# Patient Record
Sex: FEMALE | Race: ASIAN
[De-identification: names, ages, dates, MRNs, and addresses within clinical notes are randomized per-mention and may not be internally consistent; named-entity substitution may affect disease eponyms.]

---

## 2020-04-09 ENCOUNTER — HOSPITAL ENCOUNTER (EMERGENCY)
Dept: HOSPITAL 26 - MED | Age: 50
Discharge: HOME | End: 2020-04-09
Payer: COMMERCIAL

## 2020-04-09 VITALS — WEIGHT: 163 LBS | BODY MASS INDEX: 27.16 KG/M2 | HEIGHT: 65 IN

## 2020-04-09 VITALS — DIASTOLIC BLOOD PRESSURE: 94 MMHG | SYSTOLIC BLOOD PRESSURE: 131 MMHG

## 2020-04-09 VITALS — DIASTOLIC BLOOD PRESSURE: 95 MMHG | SYSTOLIC BLOOD PRESSURE: 139 MMHG

## 2020-04-09 DIAGNOSIS — E07.9: ICD-10-CM

## 2020-04-09 DIAGNOSIS — J45.909: ICD-10-CM

## 2020-04-09 DIAGNOSIS — R03.0: ICD-10-CM

## 2020-04-09 DIAGNOSIS — R42: ICD-10-CM

## 2020-04-09 DIAGNOSIS — M79.10: Primary | ICD-10-CM

## 2020-04-09 LAB
ALBUMIN FLD-MCNC: 4.3 G/DL (ref 3.4–5)
ANION GAP SERPL CALCULATED.3IONS-SCNC: 13.8 MMOL/L (ref 8–16)
APPEARANCE UR: CLEAR
AST SERPL-CCNC: 23 U/L (ref 15–37)
BASOPHILS # BLD AUTO: 0 K/UL (ref 0–0.22)
BASOPHILS NFR BLD AUTO: 0.7 % (ref 0–2)
BILIRUB SERPL-MCNC: 0.4 MG/DL (ref 0–1)
BILIRUB UR QL STRIP: NEGATIVE
BUN SERPL-MCNC: 8 MG/DL (ref 7–18)
CHLORIDE SERPL-SCNC: 102 MMOL/L (ref 98–107)
CO2 SERPL-SCNC: 28.6 MMOL/L (ref 21–32)
COLOR UR: YELLOW
CREAT SERPL-MCNC: 0.7 MG/DL (ref 0.6–1.3)
EOSINOPHIL # BLD AUTO: 0.1 K/UL (ref 0–0.4)
EOSINOPHIL NFR BLD AUTO: 1.1 % (ref 0–4)
ERYTHROCYTE [DISTWIDTH] IN BLOOD BY AUTOMATED COUNT: 12.3 % (ref 11.6–13.7)
GFR SERPL CREATININE-BSD FRML MDRD: 115 ML/MIN (ref 90–?)
GLUCOSE SERPL-MCNC: 126 MG/DL (ref 74–106)
GLUCOSE UR STRIP-MCNC: NEGATIVE MG/DL
HCT VFR BLD AUTO: 41.9 % (ref 36–48)
HGB BLD-MCNC: 13.9 G/DL (ref 12–16)
HGB UR QL STRIP: NEGATIVE
LEUKOCYTE ESTERASE UR QL STRIP: NEGATIVE
LIPASE SERPL-CCNC: 124 U/L (ref 73–393)
LYMPHOCYTES # BLD AUTO: 1.6 K/UL (ref 2.5–16.5)
LYMPHOCYTES NFR BLD AUTO: 23.5 % (ref 20.5–51.1)
MCH RBC QN AUTO: 30 PG (ref 27–31)
MCHC RBC AUTO-ENTMCNC: 33 G/DL (ref 33–37)
MCV RBC AUTO: 89.4 FL (ref 80–94)
MONOCYTES # BLD AUTO: 0.3 K/UL (ref 0.8–1)
MONOCYTES NFR BLD AUTO: 5 % (ref 1.7–9.3)
NEUTROPHILS # BLD AUTO: 4.6 K/UL (ref 1.8–7.7)
NEUTROPHILS NFR BLD AUTO: 69.7 % (ref 42.2–75.2)
NITRITE UR QL STRIP: NEGATIVE
PH UR STRIP: 6 [PH] (ref 5–9)
PLATELET # BLD AUTO: 432 K/UL (ref 140–450)
POTASSIUM SERPL-SCNC: 3.4 MMOL/L (ref 3.5–5.1)
RBC # BLD AUTO: 4.68 MIL/UL (ref 4.2–5.4)
SODIUM SERPL-SCNC: 141 MMOL/L (ref 136–145)
WBC # BLD AUTO: 6.7 K/UL (ref 4.8–10.8)

## 2020-04-09 PROCEDURE — 85025 COMPLETE CBC W/AUTO DIFF WBC: CPT

## 2020-04-09 PROCEDURE — 96374 THER/PROPH/DIAG INJ IV PUSH: CPT

## 2020-04-09 PROCEDURE — 93005 ELECTROCARDIOGRAM TRACING: CPT

## 2020-04-09 PROCEDURE — 80053 COMPREHEN METABOLIC PANEL: CPT

## 2020-04-09 PROCEDURE — 83690 ASSAY OF LIPASE: CPT

## 2020-04-09 PROCEDURE — 99285 EMERGENCY DEPT VISIT HI MDM: CPT

## 2020-04-09 PROCEDURE — 36415 COLL VENOUS BLD VENIPUNCTURE: CPT

## 2020-04-09 PROCEDURE — 87804 INFLUENZA ASSAY W/OPTIC: CPT

## 2020-04-09 PROCEDURE — 81025 URINE PREGNANCY TEST: CPT

## 2020-04-09 PROCEDURE — 81003 URINALYSIS AUTO W/O SCOPE: CPT

## 2020-04-09 PROCEDURE — 84484 ASSAY OF TROPONIN QUANT: CPT

## 2020-04-09 PROCEDURE — 71045 X-RAY EXAM CHEST 1 VIEW: CPT

## 2020-04-09 PROCEDURE — 96361 HYDRATE IV INFUSION ADD-ON: CPT

## 2020-04-17 ENCOUNTER — OFFICE (OUTPATIENT)
Dept: URBAN - METROPOLITAN AREA CLINIC 73 | Facility: CLINIC | Age: 50
End: 2020-04-17

## 2020-04-17 VITALS
WEIGHT: 161 LBS | DIASTOLIC BLOOD PRESSURE: 88 MMHG | SYSTOLIC BLOOD PRESSURE: 125 MMHG | TEMPERATURE: 98.2 F | HEART RATE: 86 BPM | HEIGHT: 65 IN

## 2020-04-17 DIAGNOSIS — R10.13 EPIGASTRIC PAIN: ICD-10-CM

## 2020-04-17 PROCEDURE — 99204 OFFICE O/P NEW MOD 45 MIN: CPT | Performed by: INTERNAL MEDICINE

## 2020-04-17 RX ORDER — SUCRALFATE 1 G/1
4 TABLET ORAL
Qty: 120 | Status: ACTIVE
Start: 2020-04-17

## 2020-04-17 RX ORDER — SUCRALFATE 1 G/1
4 TABLET ORAL
Qty: 120 | Status: COMPLETED
Start: 2020-04-17 | End: 2020-06-10

## 2020-04-17 RX ORDER — PANTOPRAZOLE SODIUM 40 MG/1
TABLET, DELAYED RELEASE ORAL
Qty: 90 | Status: COMPLETED
Start: 2020-04-17 | End: 2020-06-10

## 2020-04-17 NOTE — SERVICEHPINOTES
This is a 49-year-old female with below history who presents today for evaluation of chronic intermittent epigastric discomfort.  The symptoms have been especially pronounced over the last 2 to 3 months.  She complains of epigastric pain, lasting several hours, usually triggered by eating cold foods and hard foods.  No weight loss, night sweats, overt GI bleeding, dysphasia, recurrent nausea and vomiting.  She has never had an upper endoscopy in the past.  No family history of gastric cancer.Due to the symptoms, she went to hospital in [place] emergency room where she underwent routine laboratory testing showing normal LFTs, CBC and had a noncontrast CT of the abdomen and pelvis which was unremarkable.  She was even tested for COVID-19 that was negative as well.BR

## 2020-04-22 LAB — HELICOBACTER PYLORI, UREA BREATH TEST: DETECTED

## 2020-05-26 ENCOUNTER — OFFICE (OUTPATIENT)
Dept: URBAN - METROPOLITAN AREA CLINIC 73 | Facility: CLINIC | Age: 50
End: 2020-05-26

## 2020-05-26 VITALS — HEIGHT: 65 IN

## 2020-05-26 DIAGNOSIS — B96.81 HELICOBACTER PYLORI-ASSOCIATED GASTRITIS: ICD-10-CM

## 2020-05-26 DIAGNOSIS — R10.13 EPIGASTRIC PAIN: ICD-10-CM

## 2020-05-26 PROCEDURE — G0406 INPT/TELE FOLLOW UP 15: HCPCS | Performed by: INTERNAL MEDICINE

## 2020-05-26 PROCEDURE — 99213 OFFICE O/P EST LOW 20 MIN: CPT | Performed by: INTERNAL MEDICINE

## 2020-05-26 NOTE — SERVICEHPINOTES
This is a 49-year-old female with below history who presents today for evaluation of chronic intermittent epigastric discomfort. Due to the symptoms, she went to hospital in [place] emergency room where she underwent routine laboratory testing showing normal LFTs, CBC and had a noncontrast CT of the abdomen and pelvis which was unremarkable. She was even tested for COVID-19 that was negative as well.    She was sent for H. pylori testing and tested positive.  Subsequently, she was treated with triple therapy.  She is being followed via telehealth today and is feeling well.  She reports above 90% reduction in her symptoms.  She is continuing to take Protonix.  She denies any overt GI bleeding, weight loss, nausea, vomiting, dysphasia.BR

## 2020-05-26 NOTE — SERVICENOTES
this visit was made via telephone call as patient had great difficulty accessing and activating [employer].

## 2020-06-10 ENCOUNTER — AMBULATORY SURGICAL CENTER (OUTPATIENT)
Dept: URBAN - METROPOLITAN AREA SURGERY 4 | Facility: SURGERY | Age: 50
End: 2020-06-10

## 2020-06-10 VITALS — HEIGHT: 65 IN

## 2020-06-10 DIAGNOSIS — R10.13 EPIGASTRIC PAIN: ICD-10-CM

## 2020-06-10 DIAGNOSIS — K31.89 OTHER DISEASES OF STOMACH AND DUODENUM: ICD-10-CM

## 2020-06-10 DIAGNOSIS — B96.81 HELICOBACTER PYLORI AS THE CAUSE OF DISEASES CLASSD ELSWHR: ICD-10-CM

## 2020-06-10 PROCEDURE — 43239 EGD BIOPSY SINGLE/MULTIPLE: CPT | Performed by: INTERNAL MEDICINE

## 2020-06-10 NOTE — SERVICEHPINOTES
This is a 49-year-old female with below history who presents today for evaluation of chronic intermittent epigastric discomfort. Due to the symptoms, she went to hospital in [place] emergency room where she underwent routine laboratory testing showing normal LFTs, CBC and had a noncontrast CT of the abdomen and pelvis which was unremarkable. She was even tested for COVID-19 that was negative as well. She was sent for H. pylori testing and tested positive. Subsequently, she was treated with triple therapy. She is being followed via telehealth today and is feeling well. She reports above 90% reduction in her symptoms. She is continuing to take Protonix. She denies any overt GI bleeding, weight loss, nausea, vomiting, dysphasia.

## 2020-06-11 LAB — SURGICAL: PDF REPORT: (no result)

## 2020-06-24 ENCOUNTER — Encounter (OUTPATIENT)
Dept: URBAN - METROPOLITAN AREA CLINIC 74 | Facility: CLINIC | Age: 50
End: 2020-06-24

## 2020-06-24 VITALS — HEIGHT: 65 IN

## 2020-06-24 DIAGNOSIS — R10.13 EPIGASTRIC PAIN: ICD-10-CM

## 2020-06-24 DIAGNOSIS — B96.81 HELICOBACTER PYLORI-ASSOCIATED GASTRITIS: ICD-10-CM

## 2020-06-24 PROCEDURE — G0406 INPT/TELE FOLLOW UP 15: HCPCS | Performed by: INTERNAL MEDICINE

## 2020-06-24 PROCEDURE — 99214 OFFICE O/P EST MOD 30 MIN: CPT | Performed by: INTERNAL MEDICINE

## 2020-06-24 RX ORDER — METRONIDAZOLE 500 MG/1
1000 TABLET, FILM COATED ORAL
Qty: 20 | Status: ACTIVE
Start: 2020-06-24

## 2020-06-24 NOTE — SERVICEHPINOTES
This is a 50-year-old female with a history of H. pylori induced gastritis been followed via telehealth.  She underwent breath testing in April 2020 and tested positive after which she FONT style="BACKGROUND-COLOR: #ffffff" visited="true"was treated with triple therapy, amoxicillin based.  An endoscopy done 1 month later with gastric biopsies showed mild gastritis, with underlying HP infection.  She is being followed via telehealth today.  She feels well and has no complaints./FONT

## 2020-06-24 NOTE — SERVICENOTES
This visit was done via telephone as the patient has not been able to access [facility]

## 2020-07-09 LAB — HELICOBACTER PYLORI, UREA BREATH TEST: NOT DETECTED

## 2021-10-20 ENCOUNTER — OFFICE (OUTPATIENT)
Dept: URBAN - METROPOLITAN AREA CLINIC 73 | Facility: CLINIC | Age: 51
End: 2021-10-20

## 2021-10-20 VITALS
HEIGHT: 65 IN | WEIGHT: 140 LBS | HEART RATE: 91 BPM | SYSTOLIC BLOOD PRESSURE: 130 MMHG | DIASTOLIC BLOOD PRESSURE: 88 MMHG

## 2021-10-20 DIAGNOSIS — R10.13 EPIGASTRIC PAIN: ICD-10-CM

## 2021-10-20 DIAGNOSIS — B96.81 HELICOBACTER PYLORI-ASSOCIATED GASTRITIS: ICD-10-CM

## 2021-10-20 DIAGNOSIS — Z12.11 SCREENING FOR COLONIC NEOPLASIA: ICD-10-CM

## 2021-10-20 PROCEDURE — 99213 OFFICE O/P EST LOW 20 MIN: CPT | Performed by: INTERNAL MEDICINE

## 2021-10-20 NOTE — SERVICEHPINOTES
This is a pleasant 51-year-old female who presents with mild epigastric discomfort she characterizes as tingling sensation, not associated with meals, is 1-2 out of 10 in severity.  She has a history of H. pylori gastritis and was treated last year and with confirmation of eradication with a repeat breath test in July 2020.  However, she did some research online and she is very concerned about recurrent infection and its possible downstream effects of ulcer disease and malignancy and is requesting a repeat breath test.  She denies any other symptoms including melanotic stools weight loss jaundice.

## 2021-10-23 LAB — HELICOBACTER PYLORI, UREA BREATH TEST: NOT DETECTED

## 2021-11-10 ENCOUNTER — AMBULATORY SURGICAL CENTER (OUTPATIENT)
Dept: URBAN - METROPOLITAN AREA SURGERY 4 | Facility: SURGERY | Age: 51
End: 2021-11-10

## 2021-11-10 VITALS
RESPIRATION RATE: 19 BRPM | WEIGHT: 140 LBS | SYSTOLIC BLOOD PRESSURE: 136 MMHG | HEIGHT: 65 IN | TEMPERATURE: 98.1 F | DIASTOLIC BLOOD PRESSURE: 86 MMHG | OXYGEN SATURATION: 99 % | HEART RATE: 97 BPM

## 2021-11-10 DIAGNOSIS — K57.30 DVRTCLOS OF LG INT W/O PERFORATION OR ABSCESS W/O BLEEDING: ICD-10-CM

## 2021-11-10 DIAGNOSIS — K62.89 OTHER SPECIFIED DISEASES OF ANUS AND RECTUM: ICD-10-CM

## 2021-11-10 PROBLEM — K62.1 RECTAL POLYP: Status: ACTIVE | Noted: 2021-11-10

## 2021-11-10 PROCEDURE — 45380 COLONOSCOPY AND BIOPSY: CPT | Performed by: INTERNAL MEDICINE

## 2021-11-11 LAB — SURGICAL: PDF REPORT: (no result)

## 2021-11-30 ENCOUNTER — Encounter (OUTPATIENT)
Dept: URBAN - METROPOLITAN AREA CLINIC 74 | Facility: CLINIC | Age: 51
End: 2021-11-30

## 2021-11-30 VITALS — HEIGHT: 65 IN

## 2021-11-30 DIAGNOSIS — Z12.11 SCREENING FOR COLONIC NEOPLASIA: ICD-10-CM

## 2021-11-30 PROCEDURE — 99213 OFFICE O/P EST LOW 20 MIN: CPT | Performed by: INTERNAL MEDICINE

## 2021-11-30 PROCEDURE — G0406 INPT/TELE FOLLOW UP 15: HCPCS | Performed by: INTERNAL MEDICINE

## 2021-11-30 NOTE — SERVICEHPINOTES
51yoF who recently underwent HP Breath test and colonoscopy for colon cancer screening being followed via telehealth for results. No GI complaints today. Colonoscopy showed a benign lymphoid aggregate in the rectum with no other significant abnormalities noted. H Pylori breath test was negative. Denies any overt GI bleeding, abdominal pain, weight loss, nausea/vomiting.